# Patient Record
Sex: MALE | Race: OTHER | HISPANIC OR LATINO | URBAN - METROPOLITAN AREA
[De-identification: names, ages, dates, MRNs, and addresses within clinical notes are randomized per-mention and may not be internally consistent; named-entity substitution may affect disease eponyms.]

---

## 2021-12-25 ENCOUNTER — EMERGENCY (EMERGENCY)
Facility: HOSPITAL | Age: 66
LOS: 1 days | Discharge: HOME | End: 2021-12-25
Admitting: EMERGENCY MEDICINE
Payer: COMMERCIAL

## 2021-12-25 VITALS
RESPIRATION RATE: 18 BRPM | OXYGEN SATURATION: 99 % | SYSTOLIC BLOOD PRESSURE: 138 MMHG | DIASTOLIC BLOOD PRESSURE: 67 MMHG | HEART RATE: 51 BPM | TEMPERATURE: 98 F

## 2021-12-25 LAB — SARS-COV-2 RNA SPEC QL NAA+PROBE: DETECTED

## 2021-12-25 PROCEDURE — U0003: CPT

## 2021-12-25 PROCEDURE — 99284 EMERGENCY DEPT VISIT MOD MDM: CPT

## 2021-12-25 PROCEDURE — 99283 EMERGENCY DEPT VISIT LOW MDM: CPT

## 2021-12-25 PROCEDURE — U0005: CPT

## 2021-12-25 NOTE — ED PROVIDER NOTE - CLINICAL SUMMARY MEDICAL DECISION MAKING FREE TEXT BOX
Patient is presenting to the Emergency Department with a request to have COVID-19 testing. Reports cough since last night. Denies shortness of breath, fever, chills, bodyaches or sore throat. Pt fully vaccinated 2 months ago. +close contact. Well appearing. PE unremarkable. COVID swab sent. Isolation and return precautions discussed.

## 2021-12-25 NOTE — ED ADULT NURSE NOTE - OBJECTIVE STATEMENT
Patient present to ER requesting for COVID19 swab. Patient reports exposure to COVID19+ coworker yesterday. Patient as well reports dry cough started last night. Denies chest pain/SOB.

## 2021-12-25 NOTE — ED PROVIDER NOTE - PATIENT PORTAL LINK FT
You can access the FollowMyHealth Patient Portal offered by [facility] by registering at the following website: [email or web address]. By joining [facility]’s FollowMyHealth portal, you will also be able to view your health information using other applications (apps) compatible with our system.

## 2021-12-25 NOTE — ED PROVIDER NOTE - NS ED ROS FT
CONSTITUTIONAL:  No fever or chills, no bodyaches  HEENT:  No sore throat or headache, no nasal congestion  PULM: + cough or shortness of breath  GI:  No diarrhea, no vomiting

## 2021-12-25 NOTE — ED PROVIDER NOTE - OBJECTIVE STATEMENT
Patient is presenting to the Emergency Department with a request to have COVID-19 testing. Reports cough since last night. Denies shortness of breath, fever, chills, bodyaches or sore throat. Pt fully vaccinated 2 months ago. +close contact

## 2021-12-27 DIAGNOSIS — R05.9 COUGH, UNSPECIFIED: ICD-10-CM

## 2021-12-27 DIAGNOSIS — U07.1 COVID-19: ICD-10-CM

## 2024-08-19 PROBLEM — Z00.00 ENCOUNTER FOR PREVENTIVE HEALTH EXAMINATION: Status: ACTIVE | Noted: 2024-08-19

## 2024-08-30 ENCOUNTER — APPOINTMENT (OUTPATIENT)
Dept: ORTHOPEDIC SURGERY | Facility: CLINIC | Age: 69
End: 2024-08-30
Payer: COMMERCIAL

## 2024-08-30 VITALS — BODY MASS INDEX: 26.22 KG/M2 | HEIGHT: 63 IN | WEIGHT: 148 LBS

## 2024-08-30 DIAGNOSIS — S83.282A OTHER TEAR OF LATERAL MENISCUS, CURRENT INJURY, LEFT KNEE, INITIAL ENCOUNTER: ICD-10-CM

## 2024-08-30 DIAGNOSIS — I10 ESSENTIAL (PRIMARY) HYPERTENSION: ICD-10-CM

## 2024-08-30 DIAGNOSIS — M25.642 STIFFNESS OF LEFT HAND, NOT ELSEWHERE CLASSIFIED: ICD-10-CM

## 2024-08-30 DIAGNOSIS — Z78.9 OTHER SPECIFIED HEALTH STATUS: ICD-10-CM

## 2024-08-30 PROCEDURE — 99204 OFFICE O/P NEW MOD 45 MIN: CPT

## 2024-08-30 PROCEDURE — 73564 X-RAY EXAM KNEE 4 OR MORE: CPT | Mod: LT

## 2024-08-30 PROCEDURE — 73120 X-RAY EXAM OF HAND: CPT | Mod: LT

## 2024-08-30 RX ORDER — NAPROXEN 500 MG/1
500 TABLET ORAL
Qty: 28 | Refills: 1 | Status: ACTIVE | COMMUNITY
Start: 2024-08-30 | End: 1900-01-01

## 2024-08-30 NOTE — HISTORY OF PRESENT ILLNESS
[de-identified] : 68 yo austin comes in for evaluation of pain in his left knee and pain in his left hand primarily the middle finger. The knee pain started about a month ago.  There was no injury or inciting event.  She has pain that can be about a 5-6 out of 10 and intermittent worse with bending his knee and walking.  The knee feels tight and aches.  It is better with Tylenol.  He has not seen anyone or done any treatment for it.  No prior issues.  No injections or physical therapy. He has stiffness in the left hand primarily the middle finger intermittently without any history of injury.

## 2024-08-30 NOTE — HISTORY OF PRESENT ILLNESS
[de-identified] : 70 yo austin comes in for evaluation of pain in his left knee and pain in his left hand primarily the middle finger. The knee pain started about a month ago.  There was no injury or inciting event.  She has pain that can be about a 5-6 out of 10 and intermittent worse with bending his knee and walking.  The knee feels tight and aches.  It is better with Tylenol.  He has not seen anyone or done any treatment for it.  No prior issues.  No injections or physical therapy. He has stiffness in the left hand primarily the middle finger intermittently without any history of injury.

## 2024-08-30 NOTE — ASSESSMENT
[FreeTextEntry1] : 69-year-old gentleman with 1 month of left knee pain atraumatic localizing to the lateral joint line without any significant arthritis which may be consistent with degenerative lateral meniscus tear.  He has some mild loss of motion. I recommended for starting with physical therapy, anti-inflammatory and home exercises to see if it resolves.  He should do straight leg raises and ride a stationary bike on low resistance.  Heat and ice as needed.  I prescribed Naprosyn 500 mg twice a day with meals for the next week and then once a day for another week or 2.  If pain is getting worse he should come in sooner.  If he does not tolerate the medication in any way then he should stop taking it.  If it is not getting better then we may try an injection or get an MRI to evaluate further. He has stiffness in his left hand primarily middle finger which I think may be some early degenerative changes.  He did not have exam consistent with trigger finger or tendinitis. The NSAIDs may help and he can apply warm compresses.  We will evaluate further. Follow-up in about 5 to 6 weeks

## 2024-08-30 NOTE — PHYSICAL EXAM
[UE/LE] : Sensory: Intact in bilateral upper & lower extremities [Normal RUE] : Right Upper Extremity: No scars, rashes, lesions, ulcers, skin intact [Normal LUE] : Left Upper Extremity: No scars, rashes, lesions, ulcers, skin intact [Normal RLE] : Right Lower Extremity: No scars, rashes, lesions, ulcers, skin intact [Normal LLE] : Left Lower Extremity: No scars, rashes, lesions, ulcers, skin intact [Normal Touch] : sensation intact for touch [Normal] : Oriented to person, place, and time, insight and judgement were intact and the affect was normal [de-identified] : Left knee Nonantalgic gait over short distance. No edema, ecchymoses, erythema, warmth or effusion. Tender lateral joint line. Mild pain with Rick. Ia Lachman.  Negative anterior and posterior drawer.  Normal varus and valgus laxity.  Intact extensor mechanism. Knee range of motion is 0 to about 125 degrees on the left with pain on full flexion versus 130 degrees on the right without pain. Normal neurovascular exam distally.  Left hand No edema, ecchymoses, erythema, deformity. Full flexion and extension of the digits.  No pain. No triggering. No tenderness. Normal capillary refill. [de-identified] :  X-rays of the left knee weightbearing 4 views ordered today for knee pain were performed and reviewed and showed well-maintained joint spaces.  No fractures or acute changes.  Small bone protrusion seen on the lateral fibula bilateral knees that is symmetric.  Unremarkable  X-rays left hand AP and lateral views for stiffness and discomfort were performed and reviewed and showed some mild degenerative changes.  No fractures or acute changes

## 2024-08-30 NOTE — PHYSICAL EXAM
[UE/LE] : Sensory: Intact in bilateral upper & lower extremities [Normal RUE] : Right Upper Extremity: No scars, rashes, lesions, ulcers, skin intact [Normal LUE] : Left Upper Extremity: No scars, rashes, lesions, ulcers, skin intact [Normal RLE] : Right Lower Extremity: No scars, rashes, lesions, ulcers, skin intact [Normal LLE] : Left Lower Extremity: No scars, rashes, lesions, ulcers, skin intact [Normal Touch] : sensation intact for touch [Normal] : Oriented to person, place, and time, insight and judgement were intact and the affect was normal [de-identified] : Left knee Nonantalgic gait over short distance. No edema, ecchymoses, erythema, warmth or effusion. Tender lateral joint line. Mild pain with Rick. Ia Lachman.  Negative anterior and posterior drawer.  Normal varus and valgus laxity.  Intact extensor mechanism. Knee range of motion is 0 to about 125 degrees on the left with pain on full flexion versus 130 degrees on the right without pain. Normal neurovascular exam distally.  Left hand No edema, ecchymoses, erythema, deformity. Full flexion and extension of the digits.  No pain. No triggering. No tenderness. Normal capillary refill. [de-identified] :  X-rays of the left knee weightbearing 4 views ordered today for knee pain were performed and reviewed and showed well-maintained joint spaces.  No fractures or acute changes.  Small bone protrusion seen on the lateral fibula bilateral knees that is symmetric.  Unremarkable  X-rays left hand AP and lateral views for stiffness and discomfort were performed and reviewed and showed some mild degenerative changes.  No fractures or acute changes

## 2024-08-30 NOTE — REASON FOR VISIT
Detail Level: Detailed [Initial Visit] : an initial visit for [Knee Pain] : knee pain [FreeTextEntry2] : Hand pain Quality 226: Preventive Care And Screening: Tobacco Use: Screening And Cessation Intervention: Patient screened for tobacco use and is an ex/non-smoker Quality 111:Pneumonia Vaccination Status For Older Adults: Pneumococcal Vaccination not Administered or Previously Received, Reason not Otherwise Specified Quality 110: Preventive Care And Screening: Influenza Immunization: Influenza Immunization Ordered or Recommended, but not Administered due to system reason Quality 431: Preventive Care And Screening: Unhealthy Alcohol Use - Screening: Patient screened for unhealthy alcohol use using a single question and scores less than 2 times per year Quality 130: Documentation Of Current Medications In The Medical Record: Current Medications Documented

## 2024-10-07 ENCOUNTER — APPOINTMENT (OUTPATIENT)
Dept: ORTHOPEDIC SURGERY | Facility: CLINIC | Age: 69
End: 2024-10-07
Payer: COMMERCIAL

## 2024-10-07 DIAGNOSIS — M25.642 STIFFNESS OF LEFT HAND, NOT ELSEWHERE CLASSIFIED: ICD-10-CM

## 2024-10-07 DIAGNOSIS — S83.282A OTHER TEAR OF LATERAL MENISCUS, CURRENT INJURY, LEFT KNEE, INITIAL ENCOUNTER: ICD-10-CM

## 2024-10-07 PROCEDURE — 99213 OFFICE O/P EST LOW 20 MIN: CPT

## 2024-10-07 NOTE — PHYSICAL EXAM
[UE/LE] : Sensory: Intact in bilateral upper & lower extremities [Normal RUE] : Right Upper Extremity: No scars, rashes, lesions, ulcers, skin intact [Normal LUE] : Left Upper Extremity: No scars, rashes, lesions, ulcers, skin intact [Normal RLE] : Right Lower Extremity: No scars, rashes, lesions, ulcers, skin intact [Normal LLE] : Left Lower Extremity: No scars, rashes, lesions, ulcers, skin intact [Normal Touch] : sensation intact for touch [Normal] : Oriented to person, place, and time, insight and judgement were intact and the affect was normal [de-identified] : Left knee Nonantalgic gait over short distance. No edema, ecchymoses, erythema, warmth or effusion. Minimal tenderness around the knee No pain with Luis Miguel. Ia Lachman.  Negative anterior and posterior drawer.  Normal varus and valgus laxity.  Intact extensor mechanism. Knee range of motion is 0 to about 125 degrees on the left with discomfort on full flexion versus 130 degrees on the right without pain. Normal neurovascular exam distally.  Left hand No edema, ecchymoses, erythema, deformity. Full flexion and extension of the digits.  No pain. No triggering. No tenderness. Normal capillary refill. [de-identified] :  X-rays of the left knee weightbearing 4 views 8/30/24 showed well-maintained joint spaces.  No fractures or acute changes.  Small bone protrusion seen on the lateral fibula bilateral knees that is symmetric.  Unremarkable  X-rays on 8/30/24 of left hand AP and lateral views.  Showed some mild degenerative changes.  No fractures or acute changes

## 2024-10-07 NOTE — ASSESSMENT
[FreeTextEntry1] : 69-year-old gentleman comes in for follow-up for his left knee and hand pain both of which are much better which seems primarily from taking the Naprosyn.  Does like to see if it stays better coming off the Naprosyn.  I did give him a refill to take just 1 a day if needed.  He should do home exercises which I gave him as well.  Heat and ice as needed.  If pain comes back he should do formal therapy.  Follow-up if either the knee or hand gets worse

## 2024-10-07 NOTE — HISTORY OF PRESENT ILLNESS
[de-identified] : 70 yo praveen comes in for follow up of pain in his left knee and pain in his left hand primarily the middle finger. His LEFT knee is much better. He did not do PT but goes to the gym for exercise. He bikes and walks.  He still get occasional stiffness, that is his biggest complaint. He took the Naproxen which he feels was very helpful.  He was wondering if he could get a refill.  I had written for 1 refill which he never got.  No swelling or locking or buckling in the knee His LEFT hand is fine.  He gets a little bit of discomfort but is a lot better.

## 2024-10-07 NOTE — PHYSICAL EXAM
[UE/LE] : Sensory: Intact in bilateral upper & lower extremities [Normal RUE] : Right Upper Extremity: No scars, rashes, lesions, ulcers, skin intact [Normal LUE] : Left Upper Extremity: No scars, rashes, lesions, ulcers, skin intact [Normal RLE] : Right Lower Extremity: No scars, rashes, lesions, ulcers, skin intact [Normal LLE] : Left Lower Extremity: No scars, rashes, lesions, ulcers, skin intact [Normal Touch] : sensation intact for touch [Normal] : Oriented to person, place, and time, insight and judgement were intact and the affect was normal [de-identified] : Left knee Nonantalgic gait over short distance. No edema, ecchymoses, erythema, warmth or effusion. Minimal tenderness around the knee No pain with Luis Miguel. Ia Lachman.  Negative anterior and posterior drawer.  Normal varus and valgus laxity.  Intact extensor mechanism. Knee range of motion is 0 to about 125 degrees on the left with discomfort on full flexion versus 130 degrees on the right without pain. Normal neurovascular exam distally.  Left hand No edema, ecchymoses, erythema, deformity. Full flexion and extension of the digits.  No pain. No triggering. No tenderness. Normal capillary refill. [de-identified] :  X-rays of the left knee weightbearing 4 views 8/30/24 showed well-maintained joint spaces.  No fractures or acute changes.  Small bone protrusion seen on the lateral fibula bilateral knees that is symmetric.  Unremarkable  X-rays on 8/30/24 of left hand AP and lateral views.  Showed some mild degenerative changes.  No fractures or acute changes

## 2024-10-07 NOTE — HISTORY OF PRESENT ILLNESS
[de-identified] : 70 yo praveen comes in for follow up of pain in his left knee and pain in his left hand primarily the middle finger. His LEFT knee is much better. He did not do PT but goes to the gym for exercise. He bikes and walks.  He still get occasional stiffness, that is his biggest complaint. He took the Naproxen which he feels was very helpful.  He was wondering if he could get a refill.  I had written for 1 refill which he never got.  No swelling or locking or buckling in the knee His LEFT hand is fine.  He gets a little bit of discomfort but is a lot better.
